# Patient Record
Sex: FEMALE | Race: BLACK OR AFRICAN AMERICAN | NOT HISPANIC OR LATINO | URBAN - METROPOLITAN AREA
[De-identification: names, ages, dates, MRNs, and addresses within clinical notes are randomized per-mention and may not be internally consistent; named-entity substitution may affect disease eponyms.]

---

## 2017-04-19 ENCOUNTER — EMERGENCY (EMERGENCY)
Facility: HOSPITAL | Age: 22
LOS: 1 days | Discharge: HOME | End: 2017-04-19
Attending: EMERGENCY MEDICINE | Admitting: EMERGENCY MEDICINE
Payer: MEDICAID

## 2017-04-19 VITALS
RESPIRATION RATE: 16 BRPM | SYSTOLIC BLOOD PRESSURE: 147 MMHG | DIASTOLIC BLOOD PRESSURE: 86 MMHG | TEMPERATURE: 99 F | HEART RATE: 91 BPM | OXYGEN SATURATION: 99 %

## 2017-04-19 PROCEDURE — 99053 MED SERV 10PM-8AM 24 HR FAC: CPT

## 2017-04-19 PROCEDURE — 99284 EMERGENCY DEPT VISIT MOD MDM: CPT | Mod: 25

## 2017-04-19 RX ORDER — VALACYCLOVIR 500 MG/1
2 TABLET, FILM COATED ORAL
Qty: 42 | Refills: 0 | OUTPATIENT
Start: 2017-04-19 | End: 2017-04-26

## 2017-04-19 NOTE — ED PROVIDER NOTE - OBJECTIVE STATEMENT
22F no signigicant PMH p/w 4 days of R facial droop. Pt initially noted mild swelling of lips for which took benadryl with improvement. The next day she tried to put on chapstick but was unable to put her lips to "spread it." She then noted difficulty closing her R eye and drooling from the R side of her mouth. Denies fever/chills. No other weakess/numbness/tingling. No focal neuro deficits reported. No URI symptoms.

## 2017-04-19 NOTE — ED ADULT NURSE NOTE - OBJECTIVE STATEMENT
Break coverage- Pt received to spot #8. AAOx4, c/o right sided facial droop and numbness x4 days. No other neuro deficits noted. Able to move all extremities well. Also c/o right eye twitching. Denies any c/o pain. MD at bedside for eval. No labs drawn at this time as per MD. NAD. Awaiting further plan of care.

## 2017-04-19 NOTE — ED PROVIDER NOTE - CHIEF COMPLAINT
The patient is a 22y Female complaining of The patient is a 22y Female complaining of facial weakness

## 2017-04-19 NOTE — ED PROVIDER NOTE - PROGRESS NOTE DETAILS
TONEY Cortez: Called to quick look for antonino gutierrez. Pt reports symptom onset 4 days prior to ED presentation with R sided facial droop, inability to close R eye and drooling from R side of mouth. Forehead musculature not spared, more c/w [employer]. Pt also outside any tPA window. No stroke code called.

## 2017-04-19 NOTE — ED ADULT NURSE NOTE - CHIEF COMPLAINT QUOTE
pt c/o right sided facial droop since Saturday. pt states felt her lip tingling so she took Benadryl because she states around this time of year her lips swell and noticed her whole right side of face became numb. pt not able to blink right eye and no eyebrow raise on right side. TONEY called for stroke eval no Stroke code called. r/o Bell's Palsy

## 2017-04-19 NOTE — ED PROVIDER NOTE - ATTENDING CONTRIBUTION TO CARE
I performed a face to face bedside interview with patient regarding history of present illness, review of symptoms and past medical history. I completed an independent physical exam.  I have discussed patient's plan of care.   I agree with note as stated above, having amended the EMR as needed to reflect my findings. I have discussed the assessment and plan of care.  This includes during the time I functioned as the attending physician for this patient.  Attending Contribution to Care:pt stable, p/w r sided bells palsy, forehead sparing. pt stable for d/c. instructed to wear eye guard.

## 2017-07-03 NOTE — ED ADULT TRIAGE NOTE - CHIEF COMPLAINT QUOTE
pt c/o right sided facial droop since Saturday. pt states felt her lip tingling so she took Benadryl because she states around this time of year her lips swell and noticed her whole right side of face became numb. pt not able to blink right eye and no eyebrow raise on right side. TONEY called for stroke eval no Stroke code called. r/o Bell's Palsy
mike cisneros/Health Care Proxy (HCP)

## 2022-07-11 NOTE — ED PROVIDER NOTE - TIMING
Assessment completed earlier this shift. Has no complaints of pain thus far. Dressing changed to right heel blister, no drainage noted. Chronic garcia remains intact. LBM 7/10.  Electronically signed by Charol Skiff, RN on 7/11/22 at 4:02 PM EDT gradual onset

## 2022-07-27 NOTE — ED PROVIDER NOTE - EYES, MLM
Cipro sent to pharmacy for klebsiella infection in urine. Allergic to cephalosporins and bactrim.
Clear bilaterally, pupils equal, round and reactive to light.
